# Patient Record
Sex: FEMALE | Race: WHITE | NOT HISPANIC OR LATINO | Employment: FULL TIME | URBAN - METROPOLITAN AREA
[De-identification: names, ages, dates, MRNs, and addresses within clinical notes are randomized per-mention and may not be internally consistent; named-entity substitution may affect disease eponyms.]

---

## 2022-08-03 ENCOUNTER — OFFICE VISIT (OUTPATIENT)
Dept: OPHTHALMOLOGY | Facility: CLINIC | Age: 22
End: 2022-08-03
Payer: COMMERCIAL

## 2022-08-03 DIAGNOSIS — H52.03 HYPEROPIA, BILATERAL: Primary | ICD-10-CM

## 2022-08-03 PROCEDURE — 92310 CONTACT LENS FITTING OU: CPT | Mod: CSM,,, | Performed by: OPTOMETRIST

## 2022-08-03 PROCEDURE — 99999 PR PBB SHADOW E&M-NEW PATIENT-LVL II: CPT | Mod: PBBFAC,,, | Performed by: OPTOMETRIST

## 2022-08-03 PROCEDURE — 1159F PR MEDICATION LIST DOCUMENTED IN MEDICAL RECORD: ICD-10-PCS | Mod: CPTII,S$GLB,, | Performed by: OPTOMETRIST

## 2022-08-03 PROCEDURE — 92015 DETERMINE REFRACTIVE STATE: CPT | Mod: S$GLB,,, | Performed by: OPTOMETRIST

## 2022-08-03 PROCEDURE — 92015 PR REFRACTION: ICD-10-PCS | Mod: S$GLB,,, | Performed by: OPTOMETRIST

## 2022-08-03 PROCEDURE — 1160F RVW MEDS BY RX/DR IN RCRD: CPT | Mod: CPTII,S$GLB,, | Performed by: OPTOMETRIST

## 2022-08-03 PROCEDURE — 99999 PR PBB SHADOW E&M-NEW PATIENT-LVL II: ICD-10-PCS | Mod: PBBFAC,,, | Performed by: OPTOMETRIST

## 2022-08-03 PROCEDURE — 92004 PR EYE EXAM, NEW PATIENT,COMPREHESV: ICD-10-PCS | Mod: S$GLB,,, | Performed by: OPTOMETRIST

## 2022-08-03 PROCEDURE — 1159F MED LIST DOCD IN RCRD: CPT | Mod: CPTII,S$GLB,, | Performed by: OPTOMETRIST

## 2022-08-03 PROCEDURE — 92310 PR CONTACT LENS FITTING (NO CHANGE): ICD-10-PCS | Mod: CSM,,, | Performed by: OPTOMETRIST

## 2022-08-03 PROCEDURE — 1160F PR REVIEW ALL MEDS BY PRESCRIBER/CLIN PHARMACIST DOCUMENTED: ICD-10-PCS | Mod: CPTII,S$GLB,, | Performed by: OPTOMETRIST

## 2022-08-03 PROCEDURE — 92004 COMPRE OPH EXAM NEW PT 1/>: CPT | Mod: S$GLB,,, | Performed by: OPTOMETRIST

## 2022-08-03 RX ORDER — DROSPIRENONE AND ETHINYL ESTRADIOL 0.02-3(28)
KIT ORAL
COMMUNITY
End: 2023-06-14 | Stop reason: CLARIF

## 2022-08-03 NOTE — PROGRESS NOTES
HPI     Annual Exam     Comments: Pt reports for Rx renewal and CTL exam. Denies any pain or   irritation. Va stable.          Last edited by Campos Grady on 8/3/2022  9:30 AM. (History)            Assessment /Plan     For exam results, see Encounter Report.    Hyperopia, bilateral      Eyeglass Final Rx     Eyeglass Final Rx       Sphere    Right +0.75    Left +1.00    Expiration Date: 8/3/2023   PD=60               Contact Lens Prescription (8/3/2022)        Brand Base Curve Diameter Sphere    Right Precision1 8.3 14.2 +0.75    Left Precision1 8.3 14.2 +1.00    Expiration Date: 8/3/2023    Replacement: Daily    Wearing Schedule: Daily Wear        Dispensed trial contact lenses today. Patient is to wear lenses for 1 week.   Ok to order supply if no problems. RTC PRN if any problems arise.    Otherwise, RTC 1 yr for dilated eye exam.  Discussed above and answered questions.

## 2022-08-04 ENCOUNTER — PATIENT MESSAGE (OUTPATIENT)
Dept: OPTOMETRY | Facility: CLINIC | Age: 22
End: 2022-08-04
Payer: COMMERCIAL

## 2023-06-14 ENCOUNTER — OFFICE VISIT (OUTPATIENT)
Dept: GASTROENTEROLOGY | Facility: CLINIC | Age: 23
End: 2023-06-14
Payer: COMMERCIAL

## 2023-06-14 VITALS
DIASTOLIC BLOOD PRESSURE: 78 MMHG | WEIGHT: 114.63 LBS | HEART RATE: 90 BPM | BODY MASS INDEX: 18.42 KG/M2 | SYSTOLIC BLOOD PRESSURE: 116 MMHG | HEIGHT: 66 IN

## 2023-06-14 DIAGNOSIS — K58.2 IRRITABLE BOWEL SYNDROME WITH BOTH CONSTIPATION AND DIARRHEA: Primary | ICD-10-CM

## 2023-06-14 DIAGNOSIS — F41.9 ANXIETY: ICD-10-CM

## 2023-06-14 PROCEDURE — 99204 OFFICE O/P NEW MOD 45 MIN: CPT | Mod: S$GLB,,, | Performed by: NURSE PRACTITIONER

## 2023-06-14 PROCEDURE — 3074F PR MOST RECENT SYSTOLIC BLOOD PRESSURE < 130 MM HG: ICD-10-PCS | Mod: CPTII,S$GLB,, | Performed by: NURSE PRACTITIONER

## 2023-06-14 PROCEDURE — 1160F PR REVIEW ALL MEDS BY PRESCRIBER/CLIN PHARMACIST DOCUMENTED: ICD-10-PCS | Mod: CPTII,S$GLB,, | Performed by: NURSE PRACTITIONER

## 2023-06-14 PROCEDURE — 3074F SYST BP LT 130 MM HG: CPT | Mod: CPTII,S$GLB,, | Performed by: NURSE PRACTITIONER

## 2023-06-14 PROCEDURE — 99999 PR PBB SHADOW E&M-EST. PATIENT-LVL IV: CPT | Mod: PBBFAC,,, | Performed by: NURSE PRACTITIONER

## 2023-06-14 PROCEDURE — 99204 PR OFFICE/OUTPT VISIT, NEW, LEVL IV, 45-59 MIN: ICD-10-PCS | Mod: S$GLB,,, | Performed by: NURSE PRACTITIONER

## 2023-06-14 PROCEDURE — 99999 PR PBB SHADOW E&M-EST. PATIENT-LVL IV: ICD-10-PCS | Mod: PBBFAC,,, | Performed by: NURSE PRACTITIONER

## 2023-06-14 PROCEDURE — 1160F RVW MEDS BY RX/DR IN RCRD: CPT | Mod: CPTII,S$GLB,, | Performed by: NURSE PRACTITIONER

## 2023-06-14 PROCEDURE — 3078F DIAST BP <80 MM HG: CPT | Mod: CPTII,S$GLB,, | Performed by: NURSE PRACTITIONER

## 2023-06-14 PROCEDURE — 1159F PR MEDICATION LIST DOCUMENTED IN MEDICAL RECORD: ICD-10-PCS | Mod: CPTII,S$GLB,, | Performed by: NURSE PRACTITIONER

## 2023-06-14 PROCEDURE — 3008F BODY MASS INDEX DOCD: CPT | Mod: CPTII,S$GLB,, | Performed by: NURSE PRACTITIONER

## 2023-06-14 PROCEDURE — 1159F MED LIST DOCD IN RCRD: CPT | Mod: CPTII,S$GLB,, | Performed by: NURSE PRACTITIONER

## 2023-06-14 PROCEDURE — 3078F PR MOST RECENT DIASTOLIC BLOOD PRESSURE < 80 MM HG: ICD-10-PCS | Mod: CPTII,S$GLB,, | Performed by: NURSE PRACTITIONER

## 2023-06-14 PROCEDURE — 3008F PR BODY MASS INDEX (BMI) DOCUMENTED: ICD-10-PCS | Mod: CPTII,S$GLB,, | Performed by: NURSE PRACTITIONER

## 2023-06-14 RX ORDER — CLINDAMYCIN AND BENZOYL PEROXIDE 10; 50 MG/G; MG/G
1 GEL TOPICAL EVERY MORNING
COMMUNITY
Start: 2022-12-20

## 2023-06-14 RX ORDER — DROSPIRENONE AND ETHINYL ESTRADIOL 0.02-3(28)
KIT ORAL
COMMUNITY

## 2023-06-14 RX ORDER — TRETINOIN 0.5 MG/G
CREAM TOPICAL
COMMUNITY

## 2023-06-14 RX ORDER — DICYCLOMINE HYDROCHLORIDE 20 MG/1
20 TABLET ORAL 3 TIMES DAILY PRN
Qty: 90 TABLET | Refills: 0 | Status: SHIPPED | OUTPATIENT
Start: 2023-06-14 | End: 2023-07-09

## 2023-06-14 RX ORDER — TRAZODONE HYDROCHLORIDE 50 MG/1
25 TABLET ORAL NIGHTLY
COMMUNITY
Start: 2023-04-02

## 2023-06-14 RX ORDER — SERTRALINE HYDROCHLORIDE 100 MG/1
100 TABLET, FILM COATED ORAL EVERY MORNING
COMMUNITY
Start: 2023-05-15

## 2023-06-14 NOTE — PATIENT INSTRUCTIONS
- high fiber diet and start Metamucil daily  - Miralax daily when constipated  - dicyclomine (Bentyl) as needed for abdominal pain  - continue with anxiety management    - lactose and gluten are two common food intolerances. You may eliminate one at a time for 2 weeks. If symptoms improve, you should either limit or avoid that type of food.

## 2023-06-14 NOTE — PROGRESS NOTES
Clinic Consult:  [facility] Consultation Note    Reason for Consult:  The primary encounter diagnosis was Irritable bowel syndrome with both constipation and diarrhea. A diagnosis of Anxiety was also pertinent to this visit.    PCP: Primary Doctor No   No address on file    HPI:  This is a 22 y.o. female here for evaluation of IBS.     Has diarrhea about four times per week. 3-4 watery stools on these days. Alternates with constipation. Associated with abdominal pain-- worse with diarrhea. She describes the pain as bloating and cramping.   Pain improves after defecation.   She has anxiety-- on Zoloft. Also seeing therapist. She still does have breakthrough anxiety. She also has hx of eating disorder/restricting food.     Review of Systems   Constitutional:  Negative for fever and weight loss.   HENT:  Negative for sore throat.    Respiratory:  Negative for cough, shortness of breath and wheezing.    Cardiovascular:  Negative for chest pain and palpitations.   Gastrointestinal:  Positive for abdominal pain, constipation and diarrhea. Negative for blood in stool, heartburn, melena, nausea and vomiting.   Genitourinary:  Negative for dysuria and frequency.   Skin:  Negative for itching and rash.   Neurological:  Negative for dizziness, speech change, seizures, loss of consciousness and headaches.   Psychiatric/Behavioral:  The patient is nervous/anxious.      Medical History:  has a past medical history of Acquired hemolytic anemia, Eating disorder, and Generalized anxiety disorder.    Surgical History:  has a past surgical history that includes Moon tooth extraction.    Family History: family history includes Colon cancer in her paternal grandfather; Thyroid disease in her mother..     Social History:  reports that she has never smoked. She has never used smokeless tobacco.    Allergies: Reviewed    Home Medications:   Current Outpatient Medications on File Prior to Visit   Medication Sig Dispense  "Refill    drospirenone-ethinyl estradioL (ROMAINE) 3-0.02 mg per tablet Gianvi (28) 3 mg-0.02 mg tablet      sertraline (ZOLOFT) 100 MG tablet Take 100 mg by mouth every morning.      traZODone (DESYREL) 50 MG tablet Take 25 mg by mouth every evening.      tretinoin (RETIN-A) 0.05 % cream tretinoin 0.05 % topical cream      clindamycin-benzoyl peroxide (BENZACLIN) gel Apply 1 application topically every morning.      [DISCONTINUED] drospirenone-ethinyl estradioL (VESTURA, 28,) 3-0.02 mg per tablet        No current facility-administered medications on file prior to visit.       Physical Exam:  /78 (BP Location: Left arm, Patient Position: Sitting, BP Method: Medium (Manual))   Pulse 90   Ht 5' 6" (1.676 m)   Wt 52 kg (114 lb 10.2 oz)   LMP 06/01/2023 (Approximate)   BMI 18.50 kg/m²   Body mass index is 18.5 kg/m².  Physical Exam  Constitutional:       General: She is not in acute distress.  HENT:      Head: Normocephalic and atraumatic.   Eyes:      General: No scleral icterus.     Conjunctiva/sclera: Conjunctivae normal.   Cardiovascular:      Rate and Rhythm: Normal rate and regular rhythm.      Heart sounds: No murmur heard.  Pulmonary:      Effort: Pulmonary effort is normal. No respiratory distress.      Breath sounds: Normal breath sounds. No wheezing.   Abdominal:      General: Abdomen is flat. Bowel sounds are normal.      Palpations: Abdomen is soft.      Tenderness: There is no abdominal tenderness.   Skin:     General: Skin is warm and dry.   Neurological:      General: No focal deficit present.      Mental Status: She is alert and oriented to person, place, and time.      Cranial Nerves: No cranial nerve deficit.   Psychiatric:         Mood and Affect: Mood normal.         Judgment: Judgment normal.       Labs: Pertinent labs reviewed.  CRC Screening: NA    Assessment:  1. Irritable bowel syndrome with both constipation and diarrhea    2. Anxiety    Symptoms consistent with IBS.  Likely linked to " anxiety and previous eating disorder.  She is currently seeing a therapist to control her anxiety/eating disorder.    Recommendations:   - discussed IBS diet  - caution with restrictive diet given her previous eating disorder.  However, she can try eliminating lactose and/or gluten to see if there is any food sensitivity to those  -Bentyl as needed for abdominal pain  - Metamucil daily  - continue anxiety management/therapy    Irritable bowel syndrome with both constipation and diarrhea  -     dicyclomine (BENTYL) 20 mg tablet; Take 1 tablet (20 mg total) by mouth 3 (three) times daily as needed (abdominal pain).  Dispense: 90 tablet; Refill: 0    Anxiety      Follow up if symptoms worsen or fail to improve.    Thank you so much for allowing me to participate in the care of TERRI Trujillo

## 2023-06-16 ENCOUNTER — PATIENT MESSAGE (OUTPATIENT)
Dept: GASTROENTEROLOGY | Facility: CLINIC | Age: 23
End: 2023-06-16
Payer: COMMERCIAL

## 2023-07-07 DIAGNOSIS — K58.2 IRRITABLE BOWEL SYNDROME WITH BOTH CONSTIPATION AND DIARRHEA: ICD-10-CM

## 2023-07-09 RX ORDER — DICYCLOMINE HYDROCHLORIDE 20 MG/1
TABLET ORAL
Qty: 90 TABLET | Refills: 0 | Status: SHIPPED | OUTPATIENT
Start: 2023-07-09 | End: 2023-07-25

## 2023-08-29 ENCOUNTER — PATIENT MESSAGE (OUTPATIENT)
Dept: OPHTHALMOLOGY | Facility: CLINIC | Age: 23
End: 2023-08-29
Payer: COMMERCIAL

## 2023-08-30 ENCOUNTER — PATIENT MESSAGE (OUTPATIENT)
Dept: PEDIATRICS | Facility: CLINIC | Age: 23
End: 2023-08-30
Payer: COMMERCIAL

## 2023-09-15 ENCOUNTER — OFFICE VISIT (OUTPATIENT)
Dept: PEDIATRICS | Facility: CLINIC | Age: 23
End: 2023-09-15
Payer: COMMERCIAL

## 2023-09-15 VITALS — WEIGHT: 114.38 LBS | HEIGHT: 66 IN | BODY MASS INDEX: 18.38 KG/M2 | TEMPERATURE: 98 F

## 2023-09-15 DIAGNOSIS — J02.9 SORE THROAT: ICD-10-CM

## 2023-09-15 DIAGNOSIS — J01.80 OTHER ACUTE SINUSITIS, RECURRENCE NOT SPECIFIED: ICD-10-CM

## 2023-09-15 DIAGNOSIS — R09.81 NASAL CONGESTION: Primary | ICD-10-CM

## 2023-09-15 DIAGNOSIS — Z20.822 SUSPECTED COVID-19 VIRUS INFECTION: ICD-10-CM

## 2023-09-15 LAB
CTP QC/QA: YES
POC MOLECULAR INFLUENZA A AGN: NEGATIVE
POC MOLECULAR INFLUENZA B AGN: NEGATIVE
S PYO RRNA THROAT QL PROBE: NEGATIVE
SARS-COV-2 RDRP RESP QL NAA+PROBE: NEGATIVE

## 2023-09-15 PROCEDURE — 3008F BODY MASS INDEX DOCD: CPT | Mod: CPTII,S$GLB,, | Performed by: PEDIATRICS

## 2023-09-15 PROCEDURE — 87502 POCT INFLUENZA A/B MOLECULAR: ICD-10-PCS | Mod: QW,S$GLB,, | Performed by: PEDIATRICS

## 2023-09-15 PROCEDURE — 1159F PR MEDICATION LIST DOCUMENTED IN MEDICAL RECORD: ICD-10-PCS | Mod: CPTII,S$GLB,, | Performed by: PEDIATRICS

## 2023-09-15 PROCEDURE — 99214 OFFICE O/P EST MOD 30 MIN: CPT | Mod: 25,S$GLB,, | Performed by: PEDIATRICS

## 2023-09-15 PROCEDURE — 99999 PR PBB SHADOW E&M-EST. PATIENT-LVL III: ICD-10-PCS | Mod: PBBFAC,,, | Performed by: PEDIATRICS

## 2023-09-15 PROCEDURE — 87502 INFLUENZA DNA AMP PROBE: CPT | Mod: QW,S$GLB,, | Performed by: PEDIATRICS

## 2023-09-15 PROCEDURE — 3008F PR BODY MASS INDEX (BMI) DOCUMENTED: ICD-10-PCS | Mod: CPTII,S$GLB,, | Performed by: PEDIATRICS

## 2023-09-15 PROCEDURE — 87635: ICD-10-PCS | Mod: QW,S$GLB,, | Performed by: PEDIATRICS

## 2023-09-15 PROCEDURE — 1159F MED LIST DOCD IN RCRD: CPT | Mod: CPTII,S$GLB,, | Performed by: PEDIATRICS

## 2023-09-15 PROCEDURE — 87880 POCT RAPID STREP A: ICD-10-PCS | Mod: QW,S$GLB,, | Performed by: PEDIATRICS

## 2023-09-15 PROCEDURE — 87635 SARS-COV-2 COVID-19 AMP PRB: CPT | Mod: QW,S$GLB,, | Performed by: PEDIATRICS

## 2023-09-15 PROCEDURE — 99999 PR PBB SHADOW E&M-EST. PATIENT-LVL III: CPT | Mod: PBBFAC,,, | Performed by: PEDIATRICS

## 2023-09-15 PROCEDURE — 99214 PR OFFICE/OUTPT VISIT, EST, LEVL IV, 30-39 MIN: ICD-10-PCS | Mod: 25,S$GLB,, | Performed by: PEDIATRICS

## 2023-09-15 PROCEDURE — 87880 STREP A ASSAY W/OPTIC: CPT | Mod: QW,S$GLB,, | Performed by: PEDIATRICS

## 2023-09-15 RX ORDER — BUSPIRONE HYDROCHLORIDE 7.5 MG/1
7.5 TABLET ORAL
COMMUNITY
Start: 2023-07-25

## 2023-09-15 RX ORDER — SERTRALINE HYDROCHLORIDE 25 MG/1
25 TABLET, FILM COATED ORAL EVERY MORNING
COMMUNITY
Start: 2023-08-21

## 2023-09-15 RX ORDER — AMOXICILLIN AND CLAVULANATE POTASSIUM 875; 125 MG/1; MG/1
1 TABLET, FILM COATED ORAL 2 TIMES DAILY
Qty: 20 TABLET | Refills: 0 | Status: SHIPPED | OUTPATIENT
Start: 2023-09-15

## 2023-09-15 RX ORDER — ACETAMINOPHEN 325 MG/1
325 TABLET ORAL EVERY 6 HOURS PRN
COMMUNITY

## 2023-09-15 RX ORDER — AMOXICILLIN 875 MG/1
TABLET, FILM COATED ORAL
COMMUNITY
Start: 2023-06-30

## 2023-09-15 RX ORDER — SERTRALINE HYDROCHLORIDE 100 MG/1
100 TABLET, FILM COATED ORAL
COMMUNITY
Start: 2023-08-21 | End: 2023-11-19

## 2023-09-15 RX ORDER — BUSPIRONE HYDROCHLORIDE 7.5 MG/1
7.5 TABLET ORAL 2 TIMES DAILY
COMMUNITY
Start: 2023-08-21

## 2023-09-15 RX ORDER — AMOXICILLIN AND CLAVULANATE POTASSIUM 875; 125 MG/1; MG/1
1 TABLET, FILM COATED ORAL
COMMUNITY
Start: 2023-09-14

## 2023-09-15 NOTE — PROGRESS NOTES
"SUBJECTIVE:  Berenice Box is a 23 y.o. female here alone, who is a historian.    HPI  C/O: sore throat, wet cough, headache, fatigue, body aches, and congestion x3 days. Treating with cold and flu and Tylenol.  Bilateral frontal HA,  cough - prod light green,   Feels pressure under both eyes.  No fever that she knows of.    Zoloft 125mg QD, Buspar 7.5mg QD, Trazodone 25mg nightly.    Berenice's allergies, medications, history, and problem list were updated as appropriate.    Review of Systems  A comprehensive review of symptoms was completed and negative except as noted in the HPI.    OBJECTIVE:  Vital signs  Vitals:    09/15/23 0810   Temp: 98.4 °F (36.9 °C)   TempSrc: Oral   Weight: 51.9 kg (114 lb 6.4 oz)   Height: 5' 5.5" (1.664 m)        Physical Exam  Constitutional:       General: She is not in acute distress.     Appearance: Normal appearance. She is normal weight. She is not ill-appearing or toxic-appearing.   HENT:      Head: Normocephalic.      Comments: Maxillary sinus pain bilaterally     Right Ear: Tympanic membrane, ear canal and external ear normal.      Left Ear: Tympanic membrane, ear canal and external ear normal.      Nose: Congestion and rhinorrhea present.      Mouth/Throat:      Mouth: Mucous membranes are moist.      Pharynx: Oropharynx is clear.   Eyes:      Extraocular Movements: Extraocular movements intact.      Conjunctiva/sclera: Conjunctivae normal.      Pupils: Pupils are equal, round, and reactive to light.   Cardiovascular:      Rate and Rhythm: Normal rate and regular rhythm.      Pulses: Normal pulses.      Heart sounds: Normal heart sounds. No murmur heard.  Pulmonary:      Effort: Pulmonary effort is normal.      Breath sounds: Normal breath sounds. No wheezing or rales.   Abdominal:      General: Abdomen is flat. Bowel sounds are normal.      Palpations: Abdomen is soft.   Musculoskeletal:         General: Normal range of motion.      Cervical back: Normal range of motion " and neck supple. No tenderness.   Lymphadenopathy:      Cervical: No cervical adenopathy.   Skin:     General: Skin is warm.   Neurological:      General: No focal deficit present.      Mental Status: She is alert and oriented to person, place, and time.      Gait: Tandem walk normal.   Psychiatric:         Mood and Affect: Mood normal.         Behavior: Behavior normal.         Thought Content: Thought content normal.           ASSESSMENT/PLAN:  Berenice was seen today for cough, nasal congestion, headache, generalized body aches and fatigue.    Diagnoses and all orders for this visit:    Nasal congestion  -     POCT COVID-19 Rapid Screening  -     POCT Influenza A/B Molecular    Sore throat  -     POCT Rapid Strep A    Suspected COVID-19 virus infection    Other acute sinusitis, recurrence not specified    Other orders  -     amoxicillin-clavulanate 875-125mg (AUGMENTIN) 875-125 mg per tablet; Take 1 tablet by mouth 2 (two) times daily.     Mucinex D - 12 hr TWICE A DAY   Delsym - 3 tsp at bedtime - cough only medicine      Recent Results (from the past 672 hour(s))   POCT Rapid Strep A    Collection Time: 09/15/23  8:34 AM   Result Value Ref Range    Rapid Strep A Screen Negative Negative     Acceptable Yes    POCT COVID-19 Rapid Screening    Collection Time: 09/15/23  8:34 AM   Result Value Ref Range    POC Rapid COVID Negative Negative     Acceptable Yes    POCT Influenza A/B Molecular    Collection Time: 09/15/23  8:34 AM   Result Value Ref Range    POC Molecular Influenza A Ag Negative Negative, Not Reported    POC Molecular Influenza B Ag Negative Negative, Not Reported     Acceptable Yes          Follow Up:  No follow-ups on file.

## 2023-09-20 ENCOUNTER — PATIENT MESSAGE (OUTPATIENT)
Dept: OPHTHALMOLOGY | Facility: CLINIC | Age: 23
End: 2023-09-20

## 2023-09-20 ENCOUNTER — OFFICE VISIT (OUTPATIENT)
Dept: OPHTHALMOLOGY | Facility: CLINIC | Age: 23
End: 2023-09-20
Payer: COMMERCIAL

## 2023-09-20 DIAGNOSIS — H52.03 HYPEROPIA, BILATERAL: ICD-10-CM

## 2023-09-20 DIAGNOSIS — H04.129 DRY EYE: Primary | ICD-10-CM

## 2023-09-20 PROCEDURE — 92310 PR CONTACT LENS FITTING (NO CHANGE): ICD-10-PCS | Mod: CSM,,, | Performed by: OPTOMETRIST

## 2023-09-20 PROCEDURE — 99999 PR PBB SHADOW E&M-EST. PATIENT-LVL III: CPT | Mod: PBBFAC,,, | Performed by: OPTOMETRIST

## 2023-09-20 PROCEDURE — 92015 PR REFRACTION: ICD-10-PCS | Mod: S$GLB,,, | Performed by: OPTOMETRIST

## 2023-09-20 PROCEDURE — 92310 CONTACT LENS FITTING OU: CPT | Mod: CSM,,, | Performed by: OPTOMETRIST

## 2023-09-20 PROCEDURE — 92014 COMPRE OPH EXAM EST PT 1/>: CPT | Mod: S$GLB,,, | Performed by: OPTOMETRIST

## 2023-09-20 PROCEDURE — 1160F RVW MEDS BY RX/DR IN RCRD: CPT | Mod: CPTII,S$GLB,, | Performed by: OPTOMETRIST

## 2023-09-20 PROCEDURE — 1159F MED LIST DOCD IN RCRD: CPT | Mod: CPTII,S$GLB,, | Performed by: OPTOMETRIST

## 2023-09-20 PROCEDURE — 1160F PR REVIEW ALL MEDS BY PRESCRIBER/CLIN PHARMACIST DOCUMENTED: ICD-10-PCS | Mod: CPTII,S$GLB,, | Performed by: OPTOMETRIST

## 2023-09-20 PROCEDURE — 92014 PR EYE EXAM, EST PATIENT,COMPREHESV: ICD-10-PCS | Mod: S$GLB,,, | Performed by: OPTOMETRIST

## 2023-09-20 PROCEDURE — 92015 DETERMINE REFRACTIVE STATE: CPT | Mod: S$GLB,,, | Performed by: OPTOMETRIST

## 2023-09-20 PROCEDURE — 99999 PR PBB SHADOW E&M-EST. PATIENT-LVL III: ICD-10-PCS | Mod: PBBFAC,,, | Performed by: OPTOMETRIST

## 2023-09-20 PROCEDURE — 1159F PR MEDICATION LIST DOCUMENTED IN MEDICAL RECORD: ICD-10-PCS | Mod: CPTII,S$GLB,, | Performed by: OPTOMETRIST

## 2023-09-20 NOTE — PROGRESS NOTES
HPI     Annual Exam            Comments: Vision changes since last eye exam?: None     Any eye pain today: None, but pt states her eyes have been getting more   dry and sensitive lately. Pt says she has been staring at screens and that   causes her eyes to become more sensitive after a couple of hours.     Other ocular symptoms: No    Interested in contact lens fitting today? Yes           Last edited by Bong Tesfaye MA on 9/20/2023 10:29 AM.            Assessment /Plan     For exam results, see Encounter Report.    Dry eye  Recommended artificial tears bid OU  [employer] coupon given to pt    Hyperopia, bilateral  Eyeglass Final Rx       Eyeglass Final Rx         Sphere    Right +0.75    Left +1.00      Type: SVL    Expiration Date: 9/20/2024                  Contact Lens Prescription (9/20/2023)          Brand Base Curve Diameter Sphere    Right Precision1 8.3 14.2 +0.75    Left Precision1 8.3 14.2 +1.00      Expiration Date: 9/20/2024    Replacement: Daily    Wearing Schedule: Daily Wear          Contact Lens Prescription (9/20/2023)  #2         Brand Base Curve Diameter Sphere    Right Acuvue Oasys 1 Day Max 8.5 14.3 +0.75    Left Acuvue Oasys 1 Day Max 8.5 14.3 +1.00      Expiration Date: 9/20/2024    Replacement: Daily    Wearing Schedule: Daily Wear          Dispensed trial contact lenses today. Patient is to wear lenses for 1 week.   Ok to order supply if no problems. RTC PRN if any problems arise.    Otherwise, RTC 1 yr for undilated eye exam.  Discussed above and answered questions.

## 2023-09-22 ENCOUNTER — PATIENT MESSAGE (OUTPATIENT)
Dept: OPTOMETRY | Facility: CLINIC | Age: 23
End: 2023-09-22
Payer: COMMERCIAL

## 2024-04-08 ENCOUNTER — CLINICAL SUPPORT (OUTPATIENT)
Dept: PEDIATRICS | Facility: CLINIC | Age: 24
End: 2024-04-08
Payer: COMMERCIAL

## 2024-04-08 VITALS — TEMPERATURE: 98 F

## 2024-04-08 DIAGNOSIS — Z13.9 SCREENING DUE: Primary | ICD-10-CM

## 2024-04-08 PROCEDURE — 99999 PR PBB SHADOW E&M-EST. PATIENT-LVL III: CPT | Mod: PBBFAC,,,

## 2024-04-08 PROCEDURE — 86580 TB INTRADERMAL TEST: CPT | Mod: S$GLB,,, | Performed by: PEDIATRICS

## 2024-04-08 RX ORDER — HYDROXYZINE HYDROCHLORIDE 10 MG/1
10 TABLET, FILM COATED ORAL
COMMUNITY
Start: 2023-10-25

## 2024-04-08 RX ORDER — DESVENLAFAXINE SUCCINATE 50 MG/1
50 TABLET, EXTENDED RELEASE ORAL EVERY MORNING
COMMUNITY

## 2024-04-10 ENCOUNTER — OFFICE VISIT (OUTPATIENT)
Dept: PEDIATRICS | Facility: CLINIC | Age: 24
End: 2024-04-10
Payer: COMMERCIAL

## 2024-04-10 VITALS — BODY MASS INDEX: 19.5 KG/M2 | WEIGHT: 119 LBS | TEMPERATURE: 98 F

## 2024-04-10 DIAGNOSIS — Z28.39 IMMUNIZATIONS INCOMPLETE: Primary | ICD-10-CM

## 2024-04-10 PROCEDURE — 90715 TDAP VACCINE 7 YRS/> IM: CPT | Mod: S$GLB,,, | Performed by: PEDIATRICS

## 2024-04-10 PROCEDURE — 90472 IMMUNIZATION ADMIN EACH ADD: CPT | Mod: S$GLB,,, | Performed by: PEDIATRICS

## 2024-04-10 PROCEDURE — 90471 IMMUNIZATION ADMIN: CPT | Mod: S$GLB,,, | Performed by: PEDIATRICS

## 2024-04-10 PROCEDURE — 99999 PR PBB SHADOW E&M-EST. PATIENT-LVL III: CPT | Mod: PBBFAC,,, | Performed by: PEDIATRICS

## 2024-04-10 PROCEDURE — 99499 UNLISTED E&M SERVICE: CPT | Mod: S$GLB,,, | Performed by: PEDIATRICS

## 2024-04-10 PROCEDURE — 90744 HEPB VACC 3 DOSE PED/ADOL IM: CPT | Mod: S$GLB,,, | Performed by: PEDIATRICS

## 2024-04-10 RX ORDER — OXYCODONE AND ACETAMINOPHEN 10; 325 MG/1; MG/1
TABLET ORAL
COMMUNITY
End: 2024-04-15

## 2024-04-10 RX ORDER — SULFAMETHOXAZOLE AND TRIMETHOPRIM 800; 160 MG/1; MG/1
TABLET ORAL
COMMUNITY
End: 2024-04-15

## 2024-04-10 RX ORDER — PROMETHAZINE HYDROCHLORIDE 25 MG/1
TABLET ORAL
COMMUNITY

## 2024-04-10 RX ORDER — PENICILLIN V POTASSIUM 500 MG/1
TABLET, FILM COATED ORAL
COMMUNITY

## 2024-04-10 RX ORDER — DESVENLAFAXINE SUCCINATE 25 MG/1
1 TABLET, EXTENDED RELEASE ORAL EVERY MORNING
COMMUNITY
Start: 2023-10-31

## 2024-04-10 NOTE — PROGRESS NOTES
SUBJECTIVE:  Berenice Box is a 23 y.o. female here alone, who is a historian.    HPI  Pt presents to clinic for PPD placement. Pt tolerated well.    Berenice's allergies, medications, history, and problem list were updated as appropriate.    Review of Systems  A comprehensive review of symptoms was completed and negative except as noted in the HPI.    OBJECTIVE:  Vital signs  Vitals:    04/08/24 0828   Temp: 97.9 °F (36.6 °C)   TempSrc: Oral        Physical Exam      ASSESSMENT/PLAN:  Berenice was seen today for ppd placement.    Diagnoses and all orders for this visit:    Screening due  -     POCT TB Skin Test         No results found for this or any previous visit (from the past 672 hour(s)).    Age appropriate physical activity and nutritional counseling were completed during today's visit.     Follow Up:  No follow-ups on file.

## 2024-04-15 ENCOUNTER — CLINICAL SUPPORT (OUTPATIENT)
Dept: PEDIATRICS | Facility: CLINIC | Age: 24
End: 2024-04-15
Payer: COMMERCIAL

## 2024-04-15 VITALS — TEMPERATURE: 98 F

## 2024-04-15 DIAGNOSIS — Z13.9 SCREENING DUE: Primary | ICD-10-CM

## 2024-04-15 PROCEDURE — 86580 TB INTRADERMAL TEST: CPT | Mod: S$GLB,,, | Performed by: PEDIATRICS

## 2024-04-15 PROCEDURE — 99999 PR PBB SHADOW E&M-EST. PATIENT-LVL III: CPT | Mod: PBBFAC,,,

## 2024-05-15 ENCOUNTER — CLINICAL SUPPORT (OUTPATIENT)
Dept: PEDIATRICS | Facility: CLINIC | Age: 24
End: 2024-05-15
Payer: COMMERCIAL

## 2024-05-15 VITALS — TEMPERATURE: 99 F

## 2024-05-15 DIAGNOSIS — Z23 IMMUNIZATION DUE: Primary | ICD-10-CM

## 2024-05-15 PROCEDURE — 90744 HEPB VACC 3 DOSE PED/ADOL IM: CPT | Mod: S$GLB,,, | Performed by: PEDIATRICS

## 2024-05-15 PROCEDURE — 99999 PR PBB SHADOW E&M-EST. PATIENT-LVL II: CPT | Mod: PBBFAC,,,

## 2024-05-15 PROCEDURE — 90471 IMMUNIZATION ADMIN: CPT | Mod: S$GLB,,, | Performed by: PEDIATRICS

## 2024-05-15 NOTE — PROGRESS NOTES
SUBJECTIVE:  Berenice Box is a 23 y.o. female here alone, who is a historian.    HPI  Patient is here for second dose of Hep B. No meds or fever in last 24 hrs.    Berenice's allergies, medications, history, and problem list were updated as appropriate.    Review of Systems  A comprehensive review of symptoms was completed and negative except as noted in the HPI.    OBJECTIVE:  Vital signs  Vitals:    05/15/24 1444   Temp: 98.8 °F (37.1 °C)   TempSrc: Oral        Physical Exam      ASSESSMENT/PLAN:  There are no diagnoses linked to this encounter.     No results found for this or any previous visit (from the past 672 hour(s)).    Age appropriate physical activity and nutritional counseling were completed during today's visit.     Follow Up:  No follow-ups on file.